# Patient Record
Sex: FEMALE | Race: WHITE | NOT HISPANIC OR LATINO | Employment: PART TIME | URBAN - METROPOLITAN AREA
[De-identification: names, ages, dates, MRNs, and addresses within clinical notes are randomized per-mention and may not be internally consistent; named-entity substitution may affect disease eponyms.]

---

## 2017-01-20 ENCOUNTER — TRANSCRIBE ORDERS (OUTPATIENT)
Dept: ADMINISTRATIVE | Facility: HOSPITAL | Age: 37
End: 2017-01-20

## 2017-01-20 ENCOUNTER — HOSPITAL ENCOUNTER (OUTPATIENT)
Dept: GENERAL RADIOLOGY | Facility: HOSPITAL | Age: 37
Discharge: HOME | End: 2017-01-20
Admitting: NURSE PRACTITIONER

## 2017-01-20 DIAGNOSIS — M54.10 RADICULAR SYNDROME OF LEFT LEG: ICD-10-CM

## 2017-01-20 DIAGNOSIS — M54.10 RADICULAR SYNDROME OF LEFT LEG: Primary | ICD-10-CM

## 2017-01-20 PROCEDURE — 72114 X-RAY EXAM L-S SPINE BENDING: CPT

## 2019-01-28 RX ORDER — CYCLOBENZAPRINE HCL 10 MG
10 TABLET ORAL 3 TIMES DAILY PRN
COMMUNITY
End: 2019-01-29

## 2019-01-28 RX ORDER — DICLOFENAC SODIUM 75 MG/1
75 TABLET, DELAYED RELEASE ORAL AS NEEDED
COMMUNITY

## 2019-01-29 ENCOUNTER — OFFICE VISIT (OUTPATIENT)
Dept: NEUROSURGERY | Facility: CLINIC | Age: 39
End: 2019-01-29

## 2019-01-29 VITALS — WEIGHT: 177.6 LBS | RESPIRATION RATE: 17 BRPM | HEIGHT: 62 IN | BODY MASS INDEX: 32.68 KG/M2 | TEMPERATURE: 97.3 F

## 2019-01-29 DIAGNOSIS — M51.36 DDD (DEGENERATIVE DISC DISEASE), LUMBAR: ICD-10-CM

## 2019-01-29 DIAGNOSIS — M54.16 LUMBAR RADICULOPATHY: Primary | ICD-10-CM

## 2019-01-29 DIAGNOSIS — M51.26 HNP (HERNIATED NUCLEUS PULPOSUS), LUMBAR: ICD-10-CM

## 2019-01-29 DIAGNOSIS — M21.371 RIGHT FOOT DROP: ICD-10-CM

## 2019-01-29 PROCEDURE — 99243 OFF/OP CNSLTJ NEW/EST LOW 30: CPT | Performed by: NEUROLOGICAL SURGERY

## 2019-01-29 RX ORDER — LEVONORGESTREL/ETHINYL ESTRADIOL AND ETHINYL ESTRADIOL 100-20(84)
KIT ORAL DAILY
Refills: 0 | COMMUNITY
Start: 2018-11-20

## 2019-01-29 NOTE — PROGRESS NOTES
Patient: Ngoc Lopez  : 1980    Primary Care Provider: Alexa Taylor APRN    Requesting Provider: As above        History    Chief Complaint: Back and right leg pain with weakness and sensory alteration.    History of Present Illness: Ms. Lopez is a 38-year-old  who has had some chronic intermittent low back pain.  Around  she developed sciatic pain on the right side.  By the beginning of December she was experiencing numbness and weakness in her right leg and foot.  She has had no left leg symptoms.  She's done physical therapy for about 8 weeks.  For a while she was using an anti-inflammatory medication.  Her pain is now down to an achy process.  She has no severe radicular pain.  She continues to do some strengthening and stretching.    Review of Systems   Constitutional: Negative for activity change, appetite change, chills, diaphoresis, fatigue, fever and unexpected weight change.   HENT: Negative for congestion, dental problem, drooling, ear discharge, ear pain, facial swelling, hearing loss, mouth sores, nosebleeds, postnasal drip, rhinorrhea, sinus pressure, sneezing, sore throat, tinnitus, trouble swallowing and voice change.    Eyes: Negative for photophobia, pain, discharge, redness, itching and visual disturbance.   Respiratory: Negative for apnea, cough, choking, chest tightness, shortness of breath, wheezing and stridor.    Cardiovascular: Negative for chest pain, palpitations and leg swelling.   Gastrointestinal: Negative for abdominal distention, abdominal pain, anal bleeding, blood in stool, constipation, diarrhea, nausea, rectal pain and vomiting.   Endocrine: Negative for cold intolerance, heat intolerance, polydipsia, polyphagia and polyuria.   Genitourinary: Negative for decreased urine volume, difficulty urinating, dysuria, enuresis, flank pain, frequency, genital sores, hematuria and urgency.   Musculoskeletal: Positive for arthralgias (R-leg & foot  "pain) and back pain. Negative for gait problem, joint swelling, myalgias, neck pain and neck stiffness.   Skin: Negative for color change, pallor, rash and wound.   Allergic/Immunologic: Negative for environmental allergies, food allergies and immunocompromised state.   Neurological: Positive for numbness. Negative for dizziness, tremors, seizures, syncope, facial asymmetry, speech difficulty, weakness, light-headedness and headaches.   Hematological: Negative for adenopathy. Does not bruise/bleed easily.   Psychiatric/Behavioral: Negative for agitation, behavioral problems, confusion, decreased concentration, dysphoric mood, hallucinations, self-injury, sleep disturbance and suicidal ideas. The patient is not nervous/anxious and is not hyperactive.    All other systems reviewed and are negative.      The patient's past medical history, past surgical history, family history, and social history have been reviewed at length in the electronic medical record.    Physical Exam:   Temp 97.3 °F (36.3 °C) (Temporal)   Resp 17   Ht 157.5 cm (62\")   Wt 80.6 kg (177 lb 9.6 oz)   BMI 32.48 kg/m²   CONSTITUTIONAL: Patient is well-nourished, pleasant and appears stated age.  CV: Heart regular rate and rhythm without murmur, rub, or gallop.  PULMONARY: Lungs are clear to ascultation.  MUSCULOSKELETAL:  Straight leg raising is negative.  Michael's Sign is negative.  ROM in back is normal.  Tenderness in the back to palpation is not observed.  NEUROLOGICAL:  Orientation, memory, attention span, language function, and cognition have been examined and are intact.  Strength is intact in the lower extremities to direct testing except for right extensor hallucis longus function which is 2-3/5.  Dorsiflexion of the right foot is 4/5.  Muscle tone is normal throughout.  Station and gait are somewhat limping.  Sensation is intact to light touch testing throughout.  Deep tendon reflexes are 1+ and symmetrical at the knees and difficult " to elicit at the ankles.  Coordination is intact.      Medical Decision Making    Data Review:   MRI of the lumbar spine dated 1/4/19 demonstrates degenerative signal change within the disc from L3-4 down to L5-S1.  There is an annular fissure and some disc bulging that narrows the recesses at L4-5.  There is a generous disc-osteophyte rightward at L5-S1.    Diagnosis:   Right L5/S1 radiculopathy.    Treatment Options:   The patient's pain is better.  She does have ongoing weakness and numbness in her right foot.  I have recommended that she continue therapy and give this time.  Surgery at this point is unlikely to correct her current neurologic deficit.  If her symptoms worsen then we may have to reconsider that.  She will follow-up with me in 6 weeks to check on her progress.       Diagnosis Plan   1. Lumbar radiculopathy     2. DDD (degenerative disc disease), lumbar     3. HNP (herniated nucleus pulposus), lumbar     4. Right foot drop         Scribed for Mukund Hess MD by Kasia Regalado CMA on 1/29/2019 10:47 AM       I, Dr. Hess, personally performed the services described in the documentation, as scribed in my presence, and it is both accurate and complete.

## 2019-03-27 ENCOUNTER — OFFICE VISIT (OUTPATIENT)
Dept: NEUROSURGERY | Facility: CLINIC | Age: 39
End: 2019-03-27

## 2019-03-27 VITALS — TEMPERATURE: 97.5 F | WEIGHT: 178.2 LBS | BODY MASS INDEX: 32.79 KG/M2 | HEIGHT: 62 IN

## 2019-03-27 DIAGNOSIS — M51.36 DDD (DEGENERATIVE DISC DISEASE), LUMBAR: ICD-10-CM

## 2019-03-27 DIAGNOSIS — M54.16 LUMBAR RADICULOPATHY: Primary | ICD-10-CM

## 2019-03-27 DIAGNOSIS — M51.26 HNP (HERNIATED NUCLEUS PULPOSUS), LUMBAR: ICD-10-CM

## 2019-03-27 DIAGNOSIS — M21.371 RIGHT FOOT DROP: ICD-10-CM

## 2019-03-27 PROCEDURE — 99213 OFFICE O/P EST LOW 20 MIN: CPT | Performed by: NEUROLOGICAL SURGERY

## 2019-03-27 RX ORDER — GABAPENTIN 300 MG/1
300 CAPSULE ORAL 3 TIMES DAILY
Qty: 90 CAPSULE | Refills: 1 | OUTPATIENT
Start: 2019-03-27

## 2019-03-27 NOTE — PROGRESS NOTES
Patient: Ngoc Lopez  : 1980    Primary Care Provider: Alexa Taylor APRN    Requesting Provider: As above        History    Chief Complaint: Back and right leg pain with weakness and sensory alteration.    History of Present Illness: Ms. Lopez is a 38-year-old  who has had some chronic intermittent low back pain.  Around  she developed sciatic pain on the right side.  By the beginning of December she was experiencing numbness and weakness in her right leg and foot.  She has had no left leg symptoms.  She's did physical therapy for about 8 weeks.  For a while she was using an anti-inflammatory medication.  When seen last her pain was more of an achy pain involving her hip and leg.  About 2 days after I saw her last her pain flared up dramatically.  It settled down after she did a good deal of walking while in [place].  The pain has once again flared up and involves her hip and is extending down the right leg.  Dr. Zhang has recommended an epidural injection and apparently that was not initially approved.  Her leg pain is worse than the back pain.  She feels that the weakness in her foot may be a tiny bit better.    Review of Systems   Constitutional: Negative for activity change, appetite change, chills, diaphoresis, fatigue, fever and unexpected weight change.   HENT: Negative for congestion, dental problem, drooling, ear discharge, ear pain, facial swelling, hearing loss, mouth sores, nosebleeds, postnasal drip, rhinorrhea, sinus pressure, sneezing, sore throat, tinnitus, trouble swallowing and voice change.    Eyes: Negative for photophobia, pain, discharge, redness, itching and visual disturbance.   Respiratory: Negative for apnea, cough, choking, chest tightness, shortness of breath, wheezing and stridor.    Cardiovascular: Negative for chest pain, palpitations and leg swelling.   Gastrointestinal: Negative for abdominal distention, abdominal pain, anal bleeding,  "blood in stool, constipation, diarrhea, nausea, rectal pain and vomiting.   Endocrine: Negative for cold intolerance, heat intolerance, polydipsia, polyphagia and polyuria.   Genitourinary: Negative for decreased urine volume, difficulty urinating, dysuria, enuresis, flank pain, frequency, genital sores, hematuria and urgency.   Musculoskeletal: Positive for back pain. Negative for arthralgias, gait problem, joint swelling, myalgias, neck pain and neck stiffness.   Skin: Negative for color change, pallor, rash and wound.   Allergic/Immunologic: Negative for environmental allergies, food allergies and immunocompromised state.   Neurological: Positive for weakness. Negative for dizziness, tremors, seizures, syncope, facial asymmetry, speech difficulty, light-headedness, numbness and headaches.   Hematological: Negative for adenopathy. Does not bruise/bleed easily.   Psychiatric/Behavioral: Negative for agitation, behavioral problems, confusion, decreased concentration, dysphoric mood, hallucinations, self-injury, sleep disturbance and suicidal ideas. The patient is not nervous/anxious and is not hyperactive.    All other systems reviewed and are negative.      The patient's past medical history, past surgical history, family history, and social history have been reviewed at length in the electronic medical record.    Physical Exam:   Temp 97.5 °F (36.4 °C) (Temporal)   Ht 157.5 cm (62\")   Wt 80.8 kg (178 lb 3.2 oz)   BMI 32.59 kg/m²   MUSCULOSKELETAL:  Straight leg raising is negative.  Michael's Sign is negative.  ROM in back normal.  Tenderness in the back to palpation is not observed.  NEUROLOGICAL:  Strength is intact in the lower extremities to direct testing except for right extensor hallucis longus function which is approximately 3/5 and dorsiflexion of the right foot which is 4/5.  Muscle tone is normal throughout.  Station and gait are normal.  Sensation is intact to light touch testing " throughout.        Medical Decision Making    Data Review:   MRI of the lumbar spine dated 1/4/19 demonstrates degenerative signal change within the disc from L3-4 down to L5-S1.  There is an annular fissure and some disc bulging that narrows the recesses at L4-5.  There is a generous disc-osteophyte rightward at L5-S1.    Diagnosis:   Right L5/S1 radiculopathy.    Treatment Options:   I have started her on Neurontin and I would concur that given her ongoing pain she should undergo an epidural injection or 2.  She will follow-up with me in 6 weeks to check on her progress.       Diagnosis Plan   1. Lumbar radiculopathy     2. DDD (degenerative disc disease), lumbar     3. HNP (herniated nucleus pulposus), lumbar     4. Right foot drop         Scribed for Mukund Hess MD by Radha Vazquez CMA on 03/27/2019 at 10:22 AM      I, Dr. Hess, personally performed the services described in the documentation, as scribed in my presence, and it is both accurate and complete.

## 2019-05-08 ENCOUNTER — OFFICE VISIT (OUTPATIENT)
Dept: NEUROSURGERY | Facility: CLINIC | Age: 39
End: 2019-05-08

## 2019-05-08 VITALS — BODY MASS INDEX: 31.1 KG/M2 | HEIGHT: 62 IN | TEMPERATURE: 97.4 F | WEIGHT: 169 LBS

## 2019-05-08 DIAGNOSIS — M51.36 DDD (DEGENERATIVE DISC DISEASE), LUMBAR: ICD-10-CM

## 2019-05-08 DIAGNOSIS — M51.26 HNP (HERNIATED NUCLEUS PULPOSUS), LUMBAR: ICD-10-CM

## 2019-05-08 DIAGNOSIS — R29.898 WEAKNESS OF FOOT, RIGHT: ICD-10-CM

## 2019-05-08 DIAGNOSIS — M54.16 LUMBAR RADICULOPATHY: Primary | ICD-10-CM

## 2019-05-08 PROCEDURE — 99212 OFFICE O/P EST SF 10 MIN: CPT | Performed by: NEUROLOGICAL SURGERY

## 2019-05-08 NOTE — PROGRESS NOTES
Patient: Ngoc Lopez  : 1980    Primary Care Provider: Alexa Taylor APRN    Requesting Provider: As above        History    Chief Complaint: Back and right leg pain with weakness and sensory alteration.    History of Present Illness: Ms. Lopez is a 38-year-old  who has had some chronic intermittent low back pain.  Around  she developed sciatic pain on the right side.  By the beginning of December she was experiencing numbness and weakness in her right leg and foot.  She has had no left leg symptoms.  She's did physical therapy for about 8 weeks.  For a while she was using an anti-inflammatory medication.   She had one epidural injection.  She has been taking Neurontin 3 times a day.  She has no pain.  She complains of some weakness in her foot and toes but that is improving.    Review of Systems   Constitutional: Negative for activity change, appetite change, chills, diaphoresis, fatigue, fever and unexpected weight change.   HENT: Negative for congestion, dental problem, drooling, ear discharge, ear pain, facial swelling, hearing loss, mouth sores, nosebleeds, postnasal drip, rhinorrhea, sinus pressure, sneezing, sore throat, tinnitus, trouble swallowing and voice change.    Eyes: Negative for photophobia, pain, discharge, redness, itching and visual disturbance.   Respiratory: Negative for apnea, cough, choking, chest tightness, shortness of breath, wheezing and stridor.    Cardiovascular: Negative for chest pain, palpitations and leg swelling.   Gastrointestinal: Negative for abdominal distention, abdominal pain, anal bleeding, blood in stool, constipation, diarrhea, nausea, rectal pain and vomiting.   Endocrine: Negative for cold intolerance, heat intolerance, polydipsia, polyphagia and polyuria.   Genitourinary: Negative for decreased urine volume, difficulty urinating, dysuria, enuresis, flank pain, frequency, genital sores, hematuria and urgency.  "  Musculoskeletal: Positive for back pain. Negative for arthralgias, gait problem, joint swelling, myalgias, neck pain and neck stiffness.   Skin: Negative for color change, pallor, rash and wound.   Allergic/Immunologic: Negative for environmental allergies, food allergies and immunocompromised state.   Neurological: Positive for weakness. Negative for dizziness, tremors, seizures, syncope, facial asymmetry, speech difficulty, light-headedness, numbness and headaches.   Hematological: Negative for adenopathy. Does not bruise/bleed easily.   Psychiatric/Behavioral: Negative for agitation, behavioral problems, confusion, decreased concentration, dysphoric mood, hallucinations, self-injury, sleep disturbance and suicidal ideas. The patient is not nervous/anxious and is not hyperactive.    All other systems reviewed and are negative.      The patient's past medical history, past surgical history, family history, and social history have been reviewed at length in the electronic medical record.    Physical Exam:   Temp 97.4 °F (36.3 °C)   Ht 157.5 cm (62\")   Wt 76.7 kg (169 lb)   BMI 30.91 kg/m²   MUSCULOSKELETAL:  Straight leg raising is negative.  Michael's Sign is negative.  Tenderness in the back to palpation is not observed.  NEUROLOGICAL:  Strength is intact in the lower extremities to direct testing except for dorsiflexion of the right foot which is 4/5.  Muscle tone is normal throughout.  Station and gait are normal.  Sensation is intact to light touch testing throughout.        Medical Decision Making    Data Review:   MRI of the lumbar spine dated 1/4/19 demonstrates degenerative signal change within the disc from L3-4 down to L5-S1.  There is an annular fissure and some disc bulging that narrows the recesses at L4-5.  There is a generous disc-osteophyte rightward at L5-S1.    Diagnosis:   Right L5/S1 radiculopathy, improved.    Treatment Options:   She will continue her home exercises.  She is going to " slowly wean down on the Neurontin over the next few weeks.  If she develops recurrent difficulties I will be happy to see her in follow-up.       Diagnosis Plan   1. Lumbar radiculopathy     2. DDD (degenerative disc disease), lumbar     3. HNP (herniated nucleus pulposus), lumbar     4. Weakness of foot, right             I, Dr. Hess, personally performed the services described in the documentation, as scribed in my presence, and it is both accurate and complete.  Scribed for Mukund Hess MD by Gutierrez Mckinnon CMA on 05/08/2019 at 12:27 PM

## 2021-04-15 ENCOUNTER — IMMUNIZATION (OUTPATIENT)
Dept: VACCINE CLINIC | Facility: HOSPITAL | Age: 41
End: 2021-04-15

## 2021-04-15 PROCEDURE — 0001A: CPT | Performed by: INTERNAL MEDICINE

## 2021-04-15 PROCEDURE — 91300 HC SARSCOV02 VAC 30MCG/0.3ML IM: CPT | Performed by: INTERNAL MEDICINE

## 2021-05-05 ENCOUNTER — IMMUNIZATION (OUTPATIENT)
Dept: VACCINE CLINIC | Facility: HOSPITAL | Age: 41
End: 2021-05-05

## 2021-05-05 PROCEDURE — 91300 HC SARSCOV02 VAC 30MCG/0.3ML IM: CPT | Performed by: INTERNAL MEDICINE

## 2021-05-05 PROCEDURE — 0002A: CPT | Performed by: INTERNAL MEDICINE
